# Patient Record
Sex: FEMALE | Race: WHITE | Employment: OTHER | URBAN - METROPOLITAN AREA
[De-identification: names, ages, dates, MRNs, and addresses within clinical notes are randomized per-mention and may not be internally consistent; named-entity substitution may affect disease eponyms.]

---

## 2022-09-14 ENCOUNTER — APPOINTMENT (OUTPATIENT)
Dept: GENERAL RADIOLOGY | Age: 72
End: 2022-09-14
Payer: MEDICARE

## 2022-09-14 ENCOUNTER — HOSPITAL ENCOUNTER (EMERGENCY)
Age: 72
Discharge: HOME | End: 2022-09-14
Payer: MEDICARE

## 2022-09-14 VITALS
WEIGHT: 101 LBS | OXYGEN SATURATION: 99 % | HEIGHT: 60 IN | BODY MASS INDEX: 19.83 KG/M2 | SYSTOLIC BLOOD PRESSURE: 181 MMHG | RESPIRATION RATE: 18 BRPM | DIASTOLIC BLOOD PRESSURE: 112 MMHG | TEMPERATURE: 98.1 F | HEART RATE: 96 BPM

## 2022-09-14 DIAGNOSIS — S92.902A CLOSED FRACTURE OF LEFT FOOT, INITIAL ENCOUNTER: Primary | ICD-10-CM

## 2022-09-14 PROCEDURE — 99212 OFFICE O/P EST SF 10 MIN: CPT

## 2022-09-14 PROCEDURE — 29515 APPLICATION SHORT LEG SPLINT: CPT

## 2022-09-14 PROCEDURE — 73630 X-RAY EXAM OF FOOT: CPT

## 2022-09-14 RX ORDER — HYDROCHLOROTHIAZIDE 25 MG/1
25 TABLET ORAL DAILY
COMMUNITY

## 2022-09-14 RX ORDER — HYDROXYZINE PAMOATE 25 MG/1
25 CAPSULE ORAL 3 TIMES DAILY PRN
COMMUNITY

## 2022-09-14 RX ORDER — GLIMEPIRIDE 2 MG/1
1 TABLET ORAL 2 TIMES DAILY
COMMUNITY

## 2022-09-14 RX ORDER — HYDROCODONE BITARTRATE AND ACETAMINOPHEN 5; 325 MG/1; MG/1
1 TABLET ORAL EVERY 6 HOURS PRN
Qty: 12 TABLET | Refills: 0 | Status: SHIPPED | OUTPATIENT
Start: 2022-09-14 | End: 2022-09-17

## 2022-09-14 RX ORDER — LATANOPROST 50 UG/ML
1 SOLUTION/ DROPS OPHTHALMIC NIGHTLY
COMMUNITY

## 2022-09-14 RX ORDER — PAROXETINE 30 MG/1
30 TABLET, FILM COATED ORAL EVERY MORNING
COMMUNITY

## 2022-09-14 ASSESSMENT — PAIN SCALES - GENERAL: PAINLEVEL_OUTOF10: 4

## 2022-09-14 ASSESSMENT — PAIN DESCRIPTION - LOCATION: LOCATION: FOOT

## 2022-09-14 ASSESSMENT — PAIN DESCRIPTION - ONSET: ONSET: SUDDEN

## 2022-09-14 ASSESSMENT — PAIN DESCRIPTION - DESCRIPTORS: DESCRIPTORS: THROBBING

## 2022-09-14 ASSESSMENT — PAIN DESCRIPTION - ORIENTATION: ORIENTATION: LEFT

## 2022-09-14 ASSESSMENT — PAIN DESCRIPTION - PAIN TYPE: TYPE: ACUTE PAIN

## 2022-09-14 ASSESSMENT — PAIN - FUNCTIONAL ASSESSMENT: PAIN_FUNCTIONAL_ASSESSMENT: 0-10

## 2022-09-14 ASSESSMENT — PAIN DESCRIPTION - FREQUENCY: FREQUENCY: CONTINUOUS

## 2022-09-14 NOTE — ED PROVIDER NOTES
Department of Emergency [address] [facility]  Provider Note  Admit Date/Time: 2022 11:57 AM  Room:   NAME: Toño Blood  : 1950  MRN: [id number]     Chief Complaint:  Fall (States she fell while putting on sweat pants. Having pain and swelling on top of left foot. Denies hitting head or LOC.)    History of Present Illness        Toño Blood is a 71 y.o. female who has a past medical history of:   Past Medical History:   Diagnosis Date    Anxiety     Glaucoma     Hypertension     presents to the urgent care center by private car for evaluation. She was putting on sweatpants this morning and she fell and injured her left foot she is having pain swelling and bruising on the lateral aspect of the left foot. She denies any other injuries or complaints. States it is very painful especially when she weightbears. ROS    Pertinent positives and negatives are stated within HPI, all other systems reviewed and are negative. Past Surgical History:   Procedure Laterality Date    HYSTERECTOMY (CERVIX STATUS UNKNOWN)      SPINE SURGERY     Social History:  reports that she has been smoking cigarettes. She has been smoking an average of .5 packs per day. She has never used smokeless tobacco.  Family History: family history is not on file. Allergies: Patient has no known allergies. Physical Exam   Oxygen Saturation Interpretation: Normal.   ED Triage Vitals [22 1206]   BP Temp Temp Source Heart Rate Resp SpO2 Height Weight   (!) 175/101 98.1 °F (36.7 °C) Infrared 96 18 99 % 5' (1.524 m) 101 lb (45.8 kg)       Physical Exam  Constitutional/General: Alert and oriented x3, well appearing, non toxic in NAD  HEENT:  NC/NT  clear conjunctiva  Neck: Supple, full ROM,   Respiratory:  Not in respiratory distress  CV:  Regular rate. Musculoskeletal: left foot edematous and ecchymotic on the lateral aspect.   No color warmth and sensation present to the toes normal pedal pulse.    Integument: skin warm and dry. No rashes. Lymphatic: no lymphadenopathy noted  Neurologic: GCS 15, no focal deficits,   Psychiatric: Normal Affect    Lab / Imaging Results   (All laboratory and radiology results have been personally reviewed by myself)  Labs:  No results found for this visit on 09/14/22. Imaging: All Radiology results interpreted by Radiologist unless otherwise noted. XR FOOT LEFT (MIN 3 VIEWS)   Final Result   There are extra-articular fractures at the necks of the 4th and 5th   metatarsals. There is comminution and displacement at the 5th metatarsal   neck and minimal angulation at the 4th metatarsal neck. ED Course / Medical Decision Making   Medications - No data to display       Consult(s):   None        MDM:   He fell and injured her foot I did obtain an x-ray. Has a fractures of the fourth and fifth metatarsals which are displaced. I advised her she needs to call orthopedics tomorrow  for an appointment. Neurovascular checks are good at this point. She was placed in a posterior splint advised no weightbearing did order her some Norco for pain she should apply an ice pack 10 minutes at a time every 2-3 hours. Blood pressure is elevated today but she is anxious and in and this is uncomfortable so she has a cuff at home she will monitor her blood pressure if it continues to be elevated she will call her doctor right away or she develops any symptoms she will get reevaluated right away. Assessment      1.  Closed fracture of left foot, initial encounter      Plan   Discharge to home and advised to contact Chago Palmer MD  [address] [place] [address] [phone]    Schedule an appointment as soon as possible for a visit   Your blood pressure is too high today    [place], [address] [facility]  [phone]    Schedule an appointment as soon as possible for a visit in 1 day   Patient condition is good    New Medications     New Prescriptions    HYDROCODONE-ACETAMINOPHEN (NORCO) 5-325 MG PER TABLET    Take 1 tablet by mouth every 6 hours as needed for Pain for up to 3 days. Electronically signed by CHNE Oscar CNP   DD: 9/14/22  **This report was transcribed using voice recognition software. Every effort was made to ensure accuracy; however, inadvertent computerized transcription errors may be present.   END OF ED PROVIDER NOTE      CHEN Oscar CNP  09/14/22 [id number]

## 2022-09-14 NOTE — ED NOTES
OCL short leg splint applied to left lower leg. Crutch walking instructions given. Patient expressed and demonstrated understanding. Instructed patient to monitor and record BP and follow-up with with PCP for BP. Follow-up with Orthopedic Dr. For left foot fracture.      JONO Lombardi  89/27/67 5729

## 2022-09-22 ENCOUNTER — OFFICE VISIT (OUTPATIENT)
Dept: ORTHOPEDIC SURGERY | Age: 72
End: 2022-09-22
Payer: MEDICARE

## 2022-09-22 VITALS — HEIGHT: 60 IN | BODY MASS INDEX: 19.63 KG/M2 | WEIGHT: 100 LBS | TEMPERATURE: 98 F

## 2022-09-22 DIAGNOSIS — S92.351A CLOSED DISPLACED FRACTURE OF FIFTH METATARSAL BONE OF RIGHT FOOT, INITIAL ENCOUNTER: Primary | ICD-10-CM

## 2022-09-22 DIAGNOSIS — Z91.81 HISTORY OF RECENT FALL: ICD-10-CM

## 2022-09-22 DIAGNOSIS — S92.342A CLOSED DISPLACED FRACTURE OF FOURTH METATARSAL BONE OF LEFT FOOT, INITIAL ENCOUNTER: ICD-10-CM

## 2022-09-22 PROCEDURE — 99204 OFFICE O/P NEW MOD 45 MIN: CPT | Performed by: ORTHOPAEDIC SURGERY

## 2022-09-22 PROCEDURE — G8420 CALC BMI NORM PARAMETERS: HCPCS | Performed by: ORTHOPAEDIC SURGERY

## 2022-09-22 PROCEDURE — 1123F ACP DISCUSS/DSCN MKR DOCD: CPT | Performed by: ORTHOPAEDIC SURGERY

## 2022-09-22 PROCEDURE — G8400 PT W/DXA NO RESULTS DOC: HCPCS | Performed by: ORTHOPAEDIC SURGERY

## 2022-09-22 PROCEDURE — 4004F PT TOBACCO SCREEN RCVD TLK: CPT | Performed by: ORTHOPAEDIC SURGERY

## 2022-09-22 PROCEDURE — G8427 DOCREV CUR MEDS BY ELIG CLIN: HCPCS | Performed by: ORTHOPAEDIC SURGERY

## 2022-09-22 PROCEDURE — 1090F PRES/ABSN URINE INCON ASSESS: CPT | Performed by: ORTHOPAEDIC SURGERY

## 2022-09-22 PROCEDURE — 3017F COLORECTAL CA SCREEN DOC REV: CPT | Performed by: ORTHOPAEDIC SURGERY

## 2022-09-22 RX ORDER — HYDROCODONE BITARTRATE AND ACETAMINOPHEN 5; 325 MG/1; MG/1
1 TABLET ORAL EVERY 8 HOURS PRN
Qty: 21 TABLET | Refills: 0 | Status: SHIPPED | OUTPATIENT
Start: 2022-09-22 | End: 2022-09-29

## 2022-09-22 RX ORDER — ASPIRIN 81 MG/1
81 TABLET ORAL DAILY
Qty: 30 TABLET | Refills: 0 | Status: SHIPPED | OUTPATIENT
Start: 2022-09-22

## 2022-09-22 NOTE — PROGRESS NOTES
Chief Complaint   Patient presents with    Foot Injury     New patient left foot fx DOI: 9/14/22. Patient had a fall while putting her sweat pants on. Silva Leung is a 71 y.o. female who presents today with a Left foot injury. The injury occurred 9/14/2022. Patient reports: Falling while putting on sweat pants. the intensity is 6/10. The pain is described as: sharp. Previous treatment includes: rest, ice, heat, NSAIDs, HEP without much relief.         Past Medical History:   Diagnosis Date    Anxiety     Glaucoma     Hypertension      Past Surgical History:   Procedure Laterality Date    HYSTERECTOMY (CERVIX STATUS UNKNOWN)      SPINE SURGERY         Current Outpatient Medications:     aspirin EC 81 MG EC tablet, Take 1 tablet by mouth daily, Disp: 30 tablet, Rfl: 0    PARoxetine (PAXIL) 30 MG tablet, Take 30 mg by mouth every morning, Disp: , Rfl:     hydrOXYzine pamoate (VISTARIL) 25 MG capsule, Take 25 mg by mouth 3 times daily as needed for Itching, Disp: , Rfl:     hydroCHLOROthiazide (HYDRODIURIL) 25 MG tablet, Take 25 mg by mouth daily, Disp: , Rfl:     latanoprost (XALATAN) 0.005 % ophthalmic solution, 1 drop nightly, Disp: , Rfl:     timolol (TIMOPTIC) 0.25 % ophthalmic solution, 1 drop 2 times daily, Disp: , Rfl:   No Known Allergies  Social History     Socioeconomic History    Marital status:      Spouse name: Not on file    Number of children: Not on file    Years of education: Not on file    Highest education level: Not on file   Occupational History    Not on file   Tobacco Use    Smoking status: Every Day     Packs/day: 0.50     Types: Cigarettes    Smokeless tobacco: Never   Substance and Sexual Activity    Alcohol use: Not on file    Drug use: Not on file    Sexual activity: Not on file   Other Topics Concern    Not on file   Social History Narrative    Not on file     Social Determinants of Health     Financial Resource Strain: Not on file   Food Insecurity: Not on file Transportation Needs: Not on file   Physical Activity: Not on file   Stress: Not on file   Social Connections: Not on file   Intimate Partner Violence: Not on file   Housing Stability: Not on file     No family history on file. REVIEW OF SYSTEMS:     General/Constitution:  (-)weight loss, (-)fever, (-)chills, (-)weakness. Skin: (-) rash,(-) psoriasis,(-) eczema, (-)skin cancer. Musculoskeletal: (-) fractures,  (-) dislocations,(-) collagen vascular disease, (-) fibromyalgia, (-) multiple sclerosis, (-) muscular dystrophy, (-) RSD,(-) joint pain (-)swelling, (-) joint pain,swelling. Neurologic: (-) epilepsy, (-)seizures,(-) brain tumor,(-) TIA, (-)stroke, (-)headaches, (-)Parkinson disease,(-) memory loss, (-) LOC. Cardiovascular: (-) Chest pain, (-) swelling in legs/feet, (-) SOB, (-) cramping in legs/feet with walking. Respiratory: (-) SOB, (-) Coughing, (-) night sweats. GI: (-) nausea, (-) vomiting, (-) diarrhea, (-) blood in stool, (-) gastric ulcer. Psychiatric: (-) Depression, (-) Anxiety, (-) bipolar disease, (-) Alzheimer's Disease  Allergic/Immunologic: (-) allergies latex, (-) allergies metal, (-) skin sensitivity. Hematlogic: (-) anemia, (-) blood transfusion, (-) DVT/PE, (-) Clotting disorders      EXAM:      Constitution:  Vitals:    09/22/22 1151   Temp: 98 °F (36.7 °C)         Psycihatric:    The patient is alert and oriented x 3, appears to be stated age and in no distress. Respiratory:    Respiratory effort is not labored. Patient is not gasping. Palpation of the chest reveals no tactile fremitus. Skin:    Upon inspection: the skin appears warm, dry and intact. There is not a previous scar over the affected area. There is not any cellulitis, lymphedema or cutaneous lesions noted in the lower extremities. Upon palpation there is no induration noted.           Neurologic:      Motor exam of the lower extremities show ; quadriceps, hamstrings, foot dorsi and plantar flexors intact R.  5/5 and L. 5/5. Deep tendon reflexes are 2/4 at the knees and 2/4 at the ankles with strong extensor hallicus longus motor strength bilaterally. Sensory to both feet is intact to all sensory roots. Cardiovascular: The vascular exam is normal and is well perfused to distal extremities. Distal pulses DP/PT: R. 2+; L. 2+. There is cap refill noted less than two seconds in all digits. There is not edema of the bilateral lower extremities. There is not varicosities noted in the distal extremities. Lymph:    Upon palpation,  there is no lymphadenopathy noted in bilateral lower extremities. Musculoskeletal:    Gait: antalgic; examination of the nails and digits reveal no cyanosis or clubbing. Knee exam - bilateral knee exam shows;  range of motion of R. Knee is 0 to 140, and L. Knee is 0 to 140. The patient does not have  pain on motion, there is not an effusion, there is not tenderness over the  global region, there are not any masses, there is not ligamentous instability, there is not  deformity noted. Knee exam: knee reveals normal exam, no swelling, tenderness, instability; ligaments intact, FROM. Ankle Exam:    Upon inspection and palpation of the Left ankle,  there is not deformity noted,  moderate swelling, moderate ecchymosis, has pain on palpation of left third, fourth, fifth metatarsal region. ROM Left: Limited Secondary to pain; Right ankle : DF20; PF 40;  INV 30, LEONILA 10. This exam was compared bilaterally. Right Ankle:   (-) Anterior Drawer ,  (-) Posterior Drawer ,  (-) Squeeze test,  (-) External Rotation, (-) Eversion test , (-) Garcia Test     Left ankle:   (-) Anterior Drawer ,(-)  Posterior Drawer ,(-) Squeeze test,(-) External Rotation (-) Eversion test, (-) Garcia Test.      Foot exam- visual inspection reveals warm, good capillary refill, there is negative  pain to palpation over the metatarsals.    ROM inversion/eversion full range of motion, abduction/adduction full range of motion, ROM in MTP/PIP/DIP full range of motion. Xrays:    XR FOOT LEFT (MIN 3 VIEWS)    Result Date: 9/14/2022  EXAMINATION: THREE XRAY VIEWS OF THE LEFT FOOT 9/14/2022 12:30 pm COMPARISON: None. HISTORY: ORDERING SYSTEM PROVIDED HISTORY: lateral pain and swelling, fall TECHNOLOGIST PROVIDED HISTORY: Reason for exam:->lateral pain and swelling, fall FINDINGS: AP, lateral, and oblique views of the left foot were obtained. There is a comminuted fracture at the 5th metatarsal neck with associated displacement. No evidence of intra-articular extension. There is mildly angulated transverse fracture at the neck of the 4th metatarsal with the distal fracture fragment angle laterally. No additional evidence of acute fracture or dislocation. There are extra-articular fractures at the necks of the 4th and 5th metatarsals. There is comminution and displacement at the 5th metatarsal neck and minimal angulation at the 4th metatarsal neck. Radiographic findings reviewed with patient      Impression:  Cliff Garzon was seen today for foot injury. Diagnoses and all orders for this visit:    Closed displaced fracture of fifth metatarsal bone of left foot, initial encounter  -     HYDROcodone-acetaminophen (NORCO) 5-325 MG per tablet; Take 1 tablet by mouth every 8 hours as needed for Pain for up to 7 days. Closed displaced fracture of fourth metatarsal bone of left foot, initial encounter    History of recent fall    Other orders  -     aspirin EC 81 MG EC tablet; Take 1 tablet by mouth daily        Patient seen and examined. Imaging reviewed with patient in detail. Natural history and course discussed with patient in long discussion  Treatment options discussed with patient in detail including risks and benefits. Patient should do well with conservative management as patient would like to avoid surgery at this time. Plan:Natural history and expected course discussed. Questions answered. Rest, ice, compression, elevation (RICE) therapy. Crutches and instructions provided. Educational materials distributed. Fit with cast boot for use over next 2-4 weeks. Home exercise plan outlined. OTC analgesics as needed. It was discussed today about the nature of her fractures, patient would like to avoid surgery but she will be watched closely. Follow-up in 1 to 2 weeks for recheck with new x-rays. Aspirin for DVT prophylaxis        In a 15 minute assessment and discussion, patient was provided and fitted for a removable cast boot distributed and applied. She was educated in detail how to use cast boot and the importance of use as well as range of motion and HEP exercises. Patient educated about the healing rates with this condition. Patient does smoke and does have secondhand smoke exposure. In this discussion of approximately 5 minutes, patient was counseled about decrease in smoking exposure regards to healing and overall good health. Patient seems reluctant but is willing to listen to the discussion in detail. She states that she will try to cut down as much as possible and states that she will try to quit completely by the next visit. In this 15 minute conversation during the visit, Patient was informed of the potential for addiction of long term use of narcotic medication for pain control. I encouraged the patient to gradually lessen the frequency of the dosage due to the long term addictive effects. Patient verbalized understanding and states will try to cut back as much as possible. 45 minutes was spent with patient. 50% or greater was spent counseling the patient.

## 2022-10-05 DIAGNOSIS — S92.351A CLOSED DISPLACED FRACTURE OF FIFTH METATARSAL BONE OF RIGHT FOOT, INITIAL ENCOUNTER: Primary | ICD-10-CM

## 2022-10-06 ENCOUNTER — OFFICE VISIT (OUTPATIENT)
Dept: ORTHOPEDIC SURGERY | Age: 72
End: 2022-10-06
Payer: MEDICARE

## 2022-10-06 VITALS — BODY MASS INDEX: 19.63 KG/M2 | WEIGHT: 100 LBS | HEIGHT: 60 IN

## 2022-10-06 DIAGNOSIS — S92.332A CLOSED DISPLACED FRACTURE OF THIRD METATARSAL BONE OF LEFT FOOT, INITIAL ENCOUNTER: ICD-10-CM

## 2022-10-06 DIAGNOSIS — S92.351A CLOSED DISPLACED FRACTURE OF FIFTH METATARSAL BONE OF RIGHT FOOT, INITIAL ENCOUNTER: Primary | ICD-10-CM

## 2022-10-06 DIAGNOSIS — S92.342A CLOSED DISPLACED FRACTURE OF FOURTH METATARSAL BONE OF LEFT FOOT, INITIAL ENCOUNTER: ICD-10-CM

## 2022-10-06 PROCEDURE — 3017F COLORECTAL CA SCREEN DOC REV: CPT | Performed by: ORTHOPAEDIC SURGERY

## 2022-10-06 PROCEDURE — G8420 CALC BMI NORM PARAMETERS: HCPCS | Performed by: ORTHOPAEDIC SURGERY

## 2022-10-06 PROCEDURE — 4004F PT TOBACCO SCREEN RCVD TLK: CPT | Performed by: ORTHOPAEDIC SURGERY

## 2022-10-06 PROCEDURE — G8484 FLU IMMUNIZE NO ADMIN: HCPCS | Performed by: ORTHOPAEDIC SURGERY

## 2022-10-06 PROCEDURE — G8427 DOCREV CUR MEDS BY ELIG CLIN: HCPCS | Performed by: ORTHOPAEDIC SURGERY

## 2022-10-06 PROCEDURE — 28470 CLTX METATARSAL FX WO MNP EA: CPT | Performed by: ORTHOPAEDIC SURGERY

## 2022-10-06 PROCEDURE — 1123F ACP DISCUSS/DSCN MKR DOCD: CPT | Performed by: ORTHOPAEDIC SURGERY

## 2022-10-06 PROCEDURE — 99214 OFFICE O/P EST MOD 30 MIN: CPT | Performed by: ORTHOPAEDIC SURGERY

## 2022-10-06 PROCEDURE — 1090F PRES/ABSN URINE INCON ASSESS: CPT | Performed by: ORTHOPAEDIC SURGERY

## 2022-10-06 PROCEDURE — G8400 PT W/DXA NO RESULTS DOC: HCPCS | Performed by: ORTHOPAEDIC SURGERY

## 2022-10-06 NOTE — PROGRESS NOTES
Financial Resource Strain: Not on file   Food Insecurity: Not on file   Transportation Needs: Not on file   Physical Activity: Not on file   Stress: Not on file   Social Connections: Not on file   Intimate Partner Violence: Not on file   Housing Stability: Not on file     No family history on file. REVIEW OF SYSTEMS:     General/Constitution:  (-)weight loss, (-)fever, (-)chills, (-)weakness. Skin: (-) rash,(-) psoriasis,(-) eczema, (-)skin cancer. Musculoskeletal: (-) fractures,  (-) dislocations,(-) collagen vascular disease, (-) fibromyalgia, (-) multiple sclerosis, (-) muscular dystrophy, (-) RSD,(-) joint pain (-)swelling, (-) joint pain,swelling. Neurologic: (-) epilepsy, (-)seizures,(-) brain tumor,(-) TIA, (-)stroke, (-)headaches, (-)Parkinson disease,(-) memory loss, (-) LOC. Cardiovascular: (-) Chest pain, (-) swelling in legs/feet, (-) SOB, (-) cramping in legs/feet with walking. Respiratory: (-) SOB, (-) Coughing, (-) night sweats. GI: (-) nausea, (-) vomiting, (-) diarrhea, (-) blood in stool, (-) gastric ulcer. Psychiatric: (-) Depression, (-) Anxiety, (-) bipolar disease, (-) Alzheimer's Disease  Allergic/Immunologic: (-) allergies latex, (-) allergies metal, (-) skin sensitivity. Hematlogic: (-) anemia, (-) blood transfusion, (-) DVT/PE, (-) Clotting disorders      EXAM:      Constitution:  There were no vitals filed for this visit. Psycihatric:    The patient is alert and oriented x 3, appears to be stated age and in no distress. Respiratory:    Respiratory effort is not labored. Patient is not gasping. Palpation of the chest reveals no tactile fremitus. Skin:    Upon inspection: the skin appears warm, dry and intact. There is not a previous scar over the affected area. There is not any cellulitis, lymphedema or cutaneous lesions noted in the lower extremities. Upon palpation there is no induration noted.           Neurologic:      Motor exam of the lower extremities show ; quadriceps, hamstrings, foot dorsi and plantar flexors intact R.  5/5 and L. 5/5. Deep tendon reflexes are 2/4 at the knees and 2/4 at the ankles with strong extensor hallicus longus motor strength bilaterally. Sensory to both feet is intact to all sensory roots. Cardiovascular: The vascular exam is normal and is well perfused to distal extremities. Distal pulses DP/PT: R. 2+; L. 2+. There is cap refill noted less than two seconds in all digits. There is not edema of the bilateral lower extremities. There is not varicosities noted in the distal extremities. Lymph:    Upon palpation,  there is no lymphadenopathy noted in bilateral lower extremities. Musculoskeletal:    Gait: antalgic; examination of the nails and digits reveal no cyanosis or clubbing. Knee exam - bilateral knee exam shows;  range of motion of R. Knee is 0 to 140, and L. Knee is 0 to 140. The patient does not have  pain on motion, there is not an effusion, there is not tenderness over the  global region, there are not any masses, there is not ligamentous instability, there is not  deformity noted. Knee exam: knee reveals normal exam, no swelling, tenderness, instability; ligaments intact, FROM. Ankle Exam:    Upon inspection and palpation of the Left ankle,  there is not deformity noted,  mild to moderate swelling, moderate ecchymosis, has pain on palpation of left third, fourth, fifth metatarsal region. ROM Left: Limited Secondary to pain; Right ankle : DF20; PF 40;  INV 30, LEONILA 10. This exam was compared bilaterally.        Right Ankle:   (-) Anterior Drawer ,  (-) Posterior Drawer ,  (-) Squeeze test,  (-) External Rotation, (-) Eversion test , (-) Garcia Test     Left ankle:   (-) Anterior Drawer ,(-)  Posterior Drawer ,(-) Squeeze test,(-) External Rotation (-) Eversion test, (-) Garcia Test.      Foot exam- visual inspection reveals warm, good capillary refill, there is negative  pain to palpation over the metatarsals. ROM inversion/eversion full range of motion, abduction/adduction full range of motion, ROM in MTP/PIP/DIP full range of motion. no change since last visit. Xrays:    XR FOOT LEFT (MIN 3 VIEWS)    Result Date: 10/6/2022  EXAMINATION: THREE XRAY VIEWS OF THE LEFT FOOT 10/6/2022 10:12 am COMPARISON: 09/14/2022 HISTORY: ORDERING SYSTEM PROVIDED HISTORY: Closed displaced fracture of fifth metatarsal bone of right foot, initial encounter FINDINGS: Comminuted and displaced extra-articular fracture of the left 5th metatarsal neck. This fracture shows stable alignment and with partial healing evident by increasing fracture callus. Additional healing fractures of the 3rd and 4th metatarsal necks and with stable alignment. No new or acute fracture. Joint spaces appear preserved for age. No Lisfranc injury. The plantar arch is maintained. No radiopaque foreign body. Healing fractures of the left 3rd through 5th metatarsal necks. Fracture healing remains incomplete and recommend continued follow-up. Radiographic findings reviewed with patient      Impression:  Lluvia Amin was seen today for foot pain. Diagnoses and all orders for this visit:    Closed displaced fracture of fifth metatarsal bone of Left foot, initial encounter    Closed displaced fracture of fourth metatarsal bone of left foot, initial encounter    Closed displaced fracture of third metatarsal bone of left foot, initial encounter          Patient seen and examined. Imaging reviewed with patient in detail. Natural history and course discussed with patient in long discussion  Treatment options discussed with patient in detail including risks and benefits. Patient should do well with conservative management as patient would like to avoid surgery at this time. Plan:Natural history and expected course discussed. Questions answered. Rest, ice, compression, elevation (RICE) therapy.   Crutches and instructions provided. Educational materials distributed. Fit with cast boot for use over next 2-4 weeks. Home exercise plan outlined. OTC analgesics as needed. It was discussed today about the nature of her fractures, patient would like to avoid surgery but she will be watched closely. Follow-up in 1 to 2 weeks for recheck with new x-rays. Aspirin for DVT prophylaxis        In a 15 minute assessment and discussion, patient was refitted for a removable cast boot readjusted and applied. She was educated in detail how to use cast boot and the importance of use as well as range of motion and HEP exercises. Patient educated about the healing rates with this condition. Patient does smoke and does have secondhand smoke exposure. In this discussion of approximately 5 minutes, patient was counseled about decrease in smoking exposure regards to healing and overall good health. Patient seems reluctant but is willing to listen to the discussion in detail. She states that she will try to cut down as much as possible and states that she will try to quit completely by the next visit. Lala Recio,           25 minutes was spent with patient. 50% or greater was spent counseling the patient.

## 2022-10-26 DIAGNOSIS — S92.342A CLOSED DISPLACED FRACTURE OF FOURTH METATARSAL BONE OF LEFT FOOT, INITIAL ENCOUNTER: ICD-10-CM

## 2022-10-26 DIAGNOSIS — S92.332A CLOSED DISPLACED FRACTURE OF THIRD METATARSAL BONE OF LEFT FOOT, INITIAL ENCOUNTER: ICD-10-CM

## 2022-10-26 DIAGNOSIS — S92.351A CLOSED DISPLACED FRACTURE OF FIFTH METATARSAL BONE OF RIGHT FOOT, INITIAL ENCOUNTER: Primary | ICD-10-CM

## 2022-10-28 ENCOUNTER — OFFICE VISIT (OUTPATIENT)
Dept: ORTHOPEDIC SURGERY | Age: 72
End: 2022-10-28

## 2022-10-28 VITALS — WEIGHT: 100 LBS | TEMPERATURE: 98 F | BODY MASS INDEX: 19.63 KG/M2 | HEIGHT: 60 IN

## 2022-10-28 DIAGNOSIS — S92.342A CLOSED DISPLACED FRACTURE OF FOURTH METATARSAL BONE OF LEFT FOOT, INITIAL ENCOUNTER: ICD-10-CM

## 2022-10-28 DIAGNOSIS — S92.332A CLOSED DISPLACED FRACTURE OF THIRD METATARSAL BONE OF LEFT FOOT, INITIAL ENCOUNTER: ICD-10-CM

## 2022-10-28 DIAGNOSIS — Z91.81 HISTORY OF RECENT FALL: ICD-10-CM

## 2022-10-28 DIAGNOSIS — S92.351A CLOSED DISPLACED FRACTURE OF FIFTH METATARSAL BONE OF RIGHT FOOT, INITIAL ENCOUNTER: Primary | ICD-10-CM

## 2022-10-28 PROCEDURE — 99024 POSTOP FOLLOW-UP VISIT: CPT | Performed by: ORTHOPAEDIC SURGERY

## 2022-10-28 NOTE — PROGRESS NOTES
Chief Complaint   Patient presents with    Foot Pain     Left Foot FX F/U DOI 09/14/2022, walking with a cast boot. Brent Gross is a 71 y.o. female who presents today with a Left foot injury. The injury occurred 9/14/2022. Patient reports: Falling while putting on sweat pants. the intensity is 6/10. The pain is described as: sharp. Previous treatment includes: rest, ice, heat, NSAIDs, HEP without much relief. Follows up today and has been compliant with cast boot and states there is no pain.         Past Medical History:   Diagnosis Date    Anxiety     Glaucoma     Hypertension      Past Surgical History:   Procedure Laterality Date    HYSTERECTOMY (CERVIX STATUS UNKNOWN)      SPINE SURGERY         Current Outpatient Medications:     aspirin EC 81 MG EC tablet, Take 1 tablet by mouth daily, Disp: 30 tablet, Rfl: 0    PARoxetine (PAXIL) 30 MG tablet, Take 30 mg by mouth every morning, Disp: , Rfl:     hydrOXYzine pamoate (VISTARIL) 25 MG capsule, Take 25 mg by mouth 3 times daily as needed for Itching, Disp: , Rfl:     hydroCHLOROthiazide (HYDRODIURIL) 25 MG tablet, Take 25 mg by mouth daily, Disp: , Rfl:     latanoprost (XALATAN) 0.005 % ophthalmic solution, 1 drop nightly, Disp: , Rfl:     timolol (TIMOPTIC) 0.25 % ophthalmic solution, 1 drop 2 times daily, Disp: , Rfl:   No Known Allergies  Social History     Socioeconomic History    Marital status:      Spouse name: Not on file    Number of children: Not on file    Years of education: Not on file    Highest education level: Not on file   Occupational History    Not on file   Tobacco Use    Smoking status: Every Day     Packs/day: 0.50     Types: Cigarettes    Smokeless tobacco: Never   Substance and Sexual Activity    Alcohol use: Not on file    Drug use: Not on file    Sexual activity: Not on file   Other Topics Concern    Not on file   Social History Narrative    Not on file     Social Determinants of Health     Financial Resource Strain: Not on file   Food Insecurity: Not on file   Transportation Needs: Not on file   Physical Activity: Not on file   Stress: Not on file   Social Connections: Not on file   Intimate Partner Violence: Not on file   Housing Stability: Not on file     No family history on file. REVIEW OF SYSTEMS:     General/Constitution:  (-)weight loss, (-)fever, (-)chills, (-)weakness. Skin: (-) rash,(-) psoriasis,(-) eczema, (-)skin cancer. Musculoskeletal: (-) fractures,  (-) dislocations,(-) collagen vascular disease, (-) fibromyalgia, (-) multiple sclerosis, (-) muscular dystrophy, (-) RSD,(-) joint pain (-)swelling, (-) joint pain,swelling. Neurologic: (-) epilepsy, (-)seizures,(-) brain tumor,(-) TIA, (-)stroke, (-)headaches, (-)Parkinson disease,(-) memory loss, (-) LOC. Cardiovascular: (-) Chest pain, (-) swelling in legs/feet, (-) SOB, (-) cramping in legs/feet with walking. Respiratory: (-) SOB, (-) Coughing, (-) night sweats. GI: (-) nausea, (-) vomiting, (-) diarrhea, (-) blood in stool, (-) gastric ulcer. Psychiatric: (-) Depression, (-) Anxiety, (-) bipolar disease, (-) Alzheimer's Disease  Allergic/Immunologic: (-) allergies latex, (-) allergies metal, (-) skin sensitivity. Hematlogic: (-) anemia, (-) blood transfusion, (-) DVT/PE, (-) Clotting disorders      EXAM:      Constitution:  Vitals:    10/28/22 0804   Temp: 98 °F (36.7 °C)           Psycihatric:    The patient is alert and oriented x 3, appears to be stated age and in no distress. Respiratory:    Respiratory effort is not labored. Patient is not gasping. Palpation of the chest reveals no tactile fremitus. Skin:    Upon inspection: the skin appears warm, dry and intact. There is not a previous scar over the affected area. There is not any cellulitis, lymphedema or cutaneous lesions noted in the lower extremities. Upon palpation there is no induration noted.           Neurologic:      Motor exam of the lower extremities show ; quadriceps, hamstrings, foot dorsi and plantar flexors intact R.  5/5 and L. 5/5. Deep tendon reflexes are 2/4 at the knees and 2/4 at the ankles with strong extensor hallicus longus motor strength bilaterally. Sensory to both feet is intact to all sensory roots. Cardiovascular: The vascular exam is normal and is well perfused to distal extremities. Distal pulses DP/PT: R. 2+; L. 2+. There is cap refill noted less than two seconds in all digits. There is not edema of the bilateral lower extremities. There is not varicosities noted in the distal extremities. Lymph:    Upon palpation,  there is no lymphadenopathy noted in bilateral lower extremities. Musculoskeletal:    Gait: antalgic; examination of the nails and digits reveal no cyanosis or clubbing. Knee exam - bilateral knee exam shows;  range of motion of R. Knee is 0 to 140, and L. Knee is 0 to 140. The patient does not have  pain on motion, there is not an effusion, there is not tenderness over the  global region, there are not any masses, there is not ligamentous instability, there is not  deformity noted. Knee exam: knee reveals normal exam, no swelling, tenderness, instability; ligaments intact, FROM. Ankle Exam:    Upon inspection and palpation of the Left ankle,  there is not deformity noted,  mild swelling, no ecchymosis, has no pain on palpation of left third, fourth, fifth metatarsal region. ROM Left: Limited Secondary to stiffness; Right ankle : DF20; PF 40;  INV 30, LEONILA 10. This exam was compared bilaterally. Right Ankle:   (-) Anterior Drawer ,  (-) Posterior Drawer ,  (-) Squeeze test,  (-) External Rotation, (-) Eversion test , (-) Garcia Test     Left ankle:   (-) Anterior Drawer ,(-)  Posterior Drawer ,(-) Squeeze test,(-) External Rotation (-) Eversion test, (-) Garcia Test.      Foot exam- visual inspection reveals warm, good capillary refill, there is negative  pain to palpation over the metatarsals.    ROM inversion/eversion full range of motion, abduction/adduction full range of motion, ROM in MTP/PIP/DIP full range of motion. Xrays:    XR FOOT LEFT (MIN 3 VIEWS)    Result Date: 10/6/2022  EXAMINATION: THREE XRAY VIEWS OF THE LEFT FOOT 10/6/2022 10:12 am COMPARISON: 09/14/2022 HISTORY: ORDERING SYSTEM PROVIDED HISTORY: Closed displaced fracture of fifth metatarsal bone of right foot, initial encounter FINDINGS: Comminuted and displaced extra-articular fracture of the left 5th metatarsal neck. This fracture shows stable alignment and with partial healing evident by increasing fracture callus. Additional healing fractures of the 3rd and 4th metatarsal necks and with stable alignment. No new or acute fracture. Joint spaces appear preserved for age. No Lisfranc injury. The plantar arch is maintained. No radiopaque foreign body. Healing fractures of the left 3rd through 5th metatarsal necks. Fracture healing remains incomplete and recommend continued follow-up. XR FOOT LEFT (MIN 3 VIEWS)    Result Date: 10/29/2022  EXAMINATION: THREE XRAY VIEWS OF THE LEFT FOOT 10/28/2022 8:01 am COMPARISON: 10/06/2022 HISTORY: ORDERING SYSTEM PROVIDED HISTORY: Closed displaced fracture of fifth metatarsal bone of right foot, initial encounter FINDINGS: Three views of the left foot reveal healing fracture seen of the distal aspect of the 5th metatarsal.  The appearance of the fracture fragments are similar and unchanged when compared to the prior study. Continued healing identified of the distal 5th metatarsal fracture. No change seen in the alignment. Radiographic findings reviewed with patient      Impression:  Ruby Rubalcava was seen today for foot pain.     Diagnoses and all orders for this visit:    Closed displaced fracture of fifth metatarsal bone of right foot, initial encounter    Closed displaced fracture of fourth metatarsal bone of left foot, initial encounter    Closed displaced fracture of third metatarsal bone of left foot, initial encounter    History of recent fall            Patient seen and examined. Imaging reviewed with patient in detail. Natural history and course discussed with patient in long discussion  Treatment options discussed with patient in detail including risks and benefits. Patient should do well with conservative management as patient would like to avoid surgery at this time. Plan:Natural history and expected course discussed. Questions answered. Rest, ice, compression, elevation (RICE) therapy. Crutches and instructions provided. Educational materials distributed. Fit with cast boot for use over next 2-4 weeks. Home exercise plan outlined. OTC analgesics as needed. It was discussed today about the nature of her fractures, patient would like to avoid surgery but she will be watched closely. Follow-up in 1 to 2 weeks for recheck with new x-rays. Aspirin for DVT prophylaxis        In a 15 minute assessment and discussion, patient was refitted for a removable cast boot readjusted and applied. She was educated in detail how to use cast boot and the importance of use as well as range of motion and HEP exercises. Patient educated about the healing rates with this condition. Patient does smoke and does have secondhand smoke exposure. In this discussion of approximately 5 minutes, patient was counseled about decrease in smoking exposure regards to healing and overall good health. Patient seems reluctant but is willing to listen to the discussion in detail. She states that she will try to cut down as much as possible and states that she will try to quit completely by the next visit.         Beverley Segundo, DO

## 2022-11-30 DIAGNOSIS — S92.351A CLOSED DISPLACED FRACTURE OF FIFTH METATARSAL BONE OF RIGHT FOOT, INITIAL ENCOUNTER: Primary | ICD-10-CM

## 2022-11-30 DIAGNOSIS — S92.342A CLOSED DISPLACED FRACTURE OF FOURTH METATARSAL BONE OF LEFT FOOT, INITIAL ENCOUNTER: ICD-10-CM

## 2022-11-30 DIAGNOSIS — S92.332A CLOSED DISPLACED FRACTURE OF THIRD METATARSAL BONE OF LEFT FOOT, INITIAL ENCOUNTER: ICD-10-CM

## 2022-12-02 ENCOUNTER — OFFICE VISIT (OUTPATIENT)
Dept: ORTHOPEDIC SURGERY | Age: 72
End: 2022-12-02

## 2022-12-02 VITALS — TEMPERATURE: 98.5 F | BODY MASS INDEX: 19.63 KG/M2 | HEIGHT: 60 IN | WEIGHT: 100 LBS

## 2022-12-02 DIAGNOSIS — Z91.81 HISTORY OF RECENT FALL: ICD-10-CM

## 2022-12-02 DIAGNOSIS — S92.351A CLOSED DISPLACED FRACTURE OF FIFTH METATARSAL BONE OF RIGHT FOOT, INITIAL ENCOUNTER: Primary | ICD-10-CM

## 2022-12-02 DIAGNOSIS — S92.342A CLOSED DISPLACED FRACTURE OF FOURTH METATARSAL BONE OF LEFT FOOT, INITIAL ENCOUNTER: ICD-10-CM

## 2022-12-02 NOTE — PROGRESS NOTES
Chief Complaint   Patient presents with    Foot Pain     Follow up left foot fx 9/14/22. Patient states she has no pain and can WB without boot while at home with no issues. Saul Casas is a 71 y.o. female who presents today with a Left foot injury. The injury occurred 9/14/2022. Patient reports: Falling while putting on sweat pants. the intensity is 6/10. The pain is described as: sharp. Previous treatment includes: rest, ice, heat, NSAIDs, HEP without much relief. Follows up today and has been compliant with cast boot and states there is no pain.         Past Medical History:   Diagnosis Date    Anxiety     Glaucoma     Hypertension      Past Surgical History:   Procedure Laterality Date    HYSTERECTOMY (CERVIX STATUS UNKNOWN)      SPINE SURGERY         Current Outpatient Medications:     aspirin EC 81 MG EC tablet, Take 1 tablet by mouth daily, Disp: 30 tablet, Rfl: 0    PARoxetine (PAXIL) 30 MG tablet, Take 30 mg by mouth every morning, Disp: , Rfl:     hydrOXYzine pamoate (VISTARIL) 25 MG capsule, Take 25 mg by mouth 3 times daily as needed for Itching, Disp: , Rfl:     hydroCHLOROthiazide (HYDRODIURIL) 25 MG tablet, Take 25 mg by mouth daily, Disp: , Rfl:     latanoprost (XALATAN) 0.005 % ophthalmic solution, 1 drop nightly, Disp: , Rfl:     timolol (TIMOPTIC) 0.25 % ophthalmic solution, 1 drop 2 times daily, Disp: , Rfl:   No Known Allergies  Social History     Socioeconomic History    Marital status:      Spouse name: Not on file    Number of children: Not on file    Years of education: Not on file    Highest education level: Not on file   Occupational History    Not on file   Tobacco Use    Smoking status: Every Day     Packs/day: 0.50     Types: Cigarettes    Smokeless tobacco: Never   Substance and Sexual Activity    Alcohol use: Not on file    Drug use: Not on file    Sexual activity: Not on file   Other Topics Concern    Not on file   Social History Narrative    Not on file Social Determinants of Health     Financial Resource Strain: Not on file   Food Insecurity: Not on file   Transportation Needs: Not on file   Physical Activity: Not on file   Stress: Not on file   Social Connections: Not on file   Intimate Partner Violence: Not on file   Housing Stability: Not on file     History reviewed. No pertinent family history. REVIEW OF SYSTEMS:     General/Constitution:  (-)weight loss, (-)fever, (-)chills, (-)weakness. Skin: (-) rash,(-) psoriasis,(-) eczema, (-)skin cancer. Musculoskeletal: (-) fractures,  (-) dislocations,(-) collagen vascular disease, (-) fibromyalgia, (-) multiple sclerosis, (-) muscular dystrophy, (-) RSD,(-) joint pain (-)swelling, (-) joint pain,swelling. Neurologic: (-) epilepsy, (-)seizures,(-) brain tumor,(-) TIA, (-)stroke, (-)headaches, (-)Parkinson disease,(-) memory loss, (-) LOC. Cardiovascular: (-) Chest pain, (-) swelling in legs/feet, (-) SOB, (-) cramping in legs/feet with walking. Respiratory: (-) SOB, (-) Coughing, (-) night sweats. GI: (-) nausea, (-) vomiting, (-) diarrhea, (-) blood in stool, (-) gastric ulcer. Psychiatric: (-) Depression, (-) Anxiety, (-) bipolar disease, (-) Alzheimer's Disease  Allergic/Immunologic: (-) allergies latex, (-) allergies metal, (-) skin sensitivity. Hematlogic: (-) anemia, (-) blood transfusion, (-) DVT/PE, (-) Clotting disorders      EXAM:      Constitution:  Vitals:    12/02/22 0803   Temp: 98.5 °F (36.9 °C)           Psycihatric:    The patient is alert and oriented x 3, appears to be stated age and in no distress. Respiratory:    Respiratory effort is not labored. Patient is not gasping. Palpation of the chest reveals no tactile fremitus. Skin:    Upon inspection: the skin appears warm, dry and intact. There is not a previous scar over the affected area. There is not any cellulitis, lymphedema or cutaneous lesions noted in the lower extremities.    Upon palpation there is no induration noted.          Neurologic:      Motor exam of the lower extremities show ; quadriceps, hamstrings, foot dorsi and plantar flexors intact R.  5/5 and L. 5/5. Deep tendon reflexes are 2/4 at the knees and 2/4 at the ankles with strong extensor hallicus longus motor strength bilaterally. Sensory to both feet is intact to all sensory roots. Cardiovascular: The vascular exam is normal and is well perfused to distal extremities. Distal pulses DP/PT: R. 2+; L. 2+. There is cap refill noted less than two seconds in all digits. There is not edema of the bilateral lower extremities. There is not varicosities noted in the distal extremities. Lymph:    Upon palpation,  there is no lymphadenopathy noted in bilateral lower extremities. Musculoskeletal:    Gait: antalgic; examination of the nails and digits reveal no cyanosis or clubbing. Knee exam - bilateral knee exam shows;  range of motion of R. Knee is 0 to 140, and L. Knee is 0 to 140. The patient does not have  pain on motion, there is not an effusion, there is not tenderness over the  global region, there are not any masses, there is not ligamentous instability, there is not  deformity noted. Knee exam: knee reveals normal exam, no swelling, tenderness, instability; ligaments intact, FROM. Ankle Exam:    Upon inspection and palpation of the Left ankle,  there is not deformity noted,  mild swelling, no ecchymosis, has no pain on palpation of left third, fourth, fifth metatarsal region. ROM Left: Limited Secondary to stiffness; Right ankle : DF20; PF 40;  INV 30, LEONILA 10. This exam was compared bilaterally.        Right Ankle:   (-) Anterior Drawer ,  (-) Posterior Drawer ,  (-) Squeeze test,  (-) External Rotation, (-) Eversion test , (-) Garcia Test     Left ankle:   (-) Anterior Drawer ,(-)  Posterior Drawer ,(-) Squeeze test,(-) External Rotation (-) Eversion test, (-) Garcia Test.      Foot exam- visual inspection reveals warm, good capillary refill, there is negative  pain to palpation over the metatarsals. ROM inversion/eversion full range of motion, abduction/adduction full range of motion, ROM in MTP/PIP/DIP full range of motion. Xrays:    XR FOOT LEFT (MIN 3 VIEWS)    Result Date: 10/6/2022  EXAMINATION: THREE XRAY VIEWS OF THE LEFT FOOT 10/6/2022 10:12 am COMPARISON: 09/14/2022 HISTORY: ORDERING SYSTEM PROVIDED HISTORY: Closed displaced fracture of fifth metatarsal bone of right foot, initial encounter FINDINGS: Comminuted and displaced extra-articular fracture of the left 5th metatarsal neck. This fracture shows stable alignment and with partial healing evident by increasing fracture callus. Additional healing fractures of the 3rd and 4th metatarsal necks and with stable alignment. No new or acute fracture. Joint spaces appear preserved for age. No Lisfranc injury. The plantar arch is maintained. No radiopaque foreign body. Healing fractures of the left 3rd through 5th metatarsal necks. Fracture healing remains incomplete and recommend continued follow-up. XR FOOT LEFT (MIN 3 VIEWS)    Result Date: 10/29/2022  EXAMINATION: THREE XRAY VIEWS OF THE LEFT FOOT 10/28/2022 8:01 am COMPARISON: 10/06/2022 HISTORY: ORDERING SYSTEM PROVIDED HISTORY: Closed displaced fracture of fifth metatarsal bone of right foot, initial encounter FINDINGS: Three views of the left foot reveal healing fracture seen of the distal aspect of the 5th metatarsal.  The appearance of the fracture fragments are similar and unchanged when compared to the prior study. Continued healing identified of the distal 5th metatarsal fracture. No change seen in the alignment. XR FOOT LEFT (MIN 3 VIEWS)    Result Date: 12/2/2022  EXAMINATION: THREE XRAY VIEWS OF THE LEFT FOOT 12/2/2022 7:46 am COMPARISON: The previous study performed 10/28/2022.  HISTORY: ORDERING SYSTEM PROVIDED HISTORY: Closed displaced fracture of fifth metatarsal bone of right foot, initial encounter FINDINGS: Again identified are fractures involving the distal 3rd through 5th metatarsal bones. There has been no change in alignment and position of the fractures. Mild further healing changes/callus formation are identified involving the 5th metatarsal fracture. No gross further healing changes/callus formation are identified involving the 3rd and 4th metatarsal fractures. No new osseous or surrounding soft tissue abnormality is seen. Mild further healing changes/callus formation involving the 5th metatarsal fracture, when compared to the previous study performed 10/28/2022. No other significant interval change. Radiographic findings reviewed with patient      Impression:  Amrit Amaro was seen today for foot pain. Diagnoses and all orders for this visit:    Closed displaced fracture of fifth metatarsal bone of right foot, initial encounter    Closed displaced fracture of fourth metatarsal bone of left foot, initial encounter    History of recent fall              Patient seen and examined. Imaging reviewed with patient in detail. Natural history and course discussed with patient in long discussion  Treatment options discussed with patient in detail including risks and benefits. Patient should do well with conservative management as patient would like to avoid surgery at this time. Plan:Natural history and expected course discussed. Questions answered. Rest, ice, compression, elevation (RICE) therapy. Crutches and instructions provided. Educational materials distributed. Home exercise plan outlined. OTC analgesics as needed. It was discussed today about the nature of her fractures, patient would like to avoid surgery but she will be watched closely. Follow-up in 4 weeks for recheck with new x-rays. Aspirin for DVT prophylaxis            Patient educated about the healing rates with this condition.  Patient does smoke and does have secondhand smoke exposure. In this discussion of approximately 5 minutes, patient was counseled about decrease in smoking exposure regards to healing and overall good health. Patient seems reluctant but is willing to listen to the discussion in detail. She states that she will try to cut down as much as possible and states that she will try to quit completely by the next visit.         Kayleigh Cooley, DO

## 2023-01-09 DIAGNOSIS — S92.351A CLOSED DISPLACED FRACTURE OF FIFTH METATARSAL BONE OF RIGHT FOOT, INITIAL ENCOUNTER: Primary | ICD-10-CM

## 2023-01-09 DIAGNOSIS — S92.342A CLOSED DISPLACED FRACTURE OF FOURTH METATARSAL BONE OF LEFT FOOT, INITIAL ENCOUNTER: ICD-10-CM

## 2023-01-11 ENCOUNTER — OFFICE VISIT (OUTPATIENT)
Dept: ORTHOPEDIC SURGERY | Age: 73
End: 2023-01-11
Payer: MEDICARE

## 2023-01-11 VITALS — TEMPERATURE: 98 F | WEIGHT: 100 LBS | HEIGHT: 60 IN | BODY MASS INDEX: 19.63 KG/M2

## 2023-01-11 DIAGNOSIS — Z91.81 HISTORY OF RECENT FALL: ICD-10-CM

## 2023-01-11 DIAGNOSIS — S92.351A CLOSED DISPLACED FRACTURE OF FIFTH METATARSAL BONE OF RIGHT FOOT, INITIAL ENCOUNTER: Primary | ICD-10-CM

## 2023-01-11 DIAGNOSIS — S92.342A CLOSED DISPLACED FRACTURE OF FOURTH METATARSAL BONE OF LEFT FOOT, INITIAL ENCOUNTER: ICD-10-CM

## 2023-01-11 PROCEDURE — 4004F PT TOBACCO SCREEN RCVD TLK: CPT | Performed by: ORTHOPAEDIC SURGERY

## 2023-01-11 PROCEDURE — 3017F COLORECTAL CA SCREEN DOC REV: CPT | Performed by: ORTHOPAEDIC SURGERY

## 2023-01-11 PROCEDURE — G8400 PT W/DXA NO RESULTS DOC: HCPCS | Performed by: ORTHOPAEDIC SURGERY

## 2023-01-11 PROCEDURE — G8427 DOCREV CUR MEDS BY ELIG CLIN: HCPCS | Performed by: ORTHOPAEDIC SURGERY

## 2023-01-11 PROCEDURE — G8420 CALC BMI NORM PARAMETERS: HCPCS | Performed by: ORTHOPAEDIC SURGERY

## 2023-01-11 PROCEDURE — G8484 FLU IMMUNIZE NO ADMIN: HCPCS | Performed by: ORTHOPAEDIC SURGERY

## 2023-01-11 PROCEDURE — 1090F PRES/ABSN URINE INCON ASSESS: CPT | Performed by: ORTHOPAEDIC SURGERY

## 2023-01-11 PROCEDURE — 1123F ACP DISCUSS/DSCN MKR DOCD: CPT | Performed by: ORTHOPAEDIC SURGERY

## 2023-01-11 PROCEDURE — 99213 OFFICE O/P EST LOW 20 MIN: CPT | Performed by: ORTHOPAEDIC SURGERY

## 2023-01-11 NOTE — PROGRESS NOTES
Chief Complaint   Patient presents with    Foot Pain     Left foot fx f/u doing much better. Ashlyn Shah is a 71 y.o. female who presents today with a Left foot injury. The injury occurred 9/14/2022. Patient reports: Falling while putting on sweat pants. the intensity is 6/10. The pain is described as: sharp. Previous treatment includes: rest, ice, heat, NSAIDs, HEP without much relief. Follows up today and has been out of cast boot and states there is no pain.         Past Medical History:   Diagnosis Date    Anxiety     Glaucoma     Hypertension      Past Surgical History:   Procedure Laterality Date    HYSTERECTOMY (CERVIX STATUS UNKNOWN)      SPINE SURGERY         Current Outpatient Medications:     aspirin EC 81 MG EC tablet, Take 1 tablet by mouth daily, Disp: 30 tablet, Rfl: 0    PARoxetine (PAXIL) 30 MG tablet, Take 30 mg by mouth every morning, Disp: , Rfl:     hydrOXYzine pamoate (VISTARIL) 25 MG capsule, Take 25 mg by mouth 3 times daily as needed for Itching, Disp: , Rfl:     hydroCHLOROthiazide (HYDRODIURIL) 25 MG tablet, Take 25 mg by mouth daily, Disp: , Rfl:     latanoprost (XALATAN) 0.005 % ophthalmic solution, 1 drop nightly, Disp: , Rfl:     timolol (TIMOPTIC) 0.25 % ophthalmic solution, 1 drop 2 times daily, Disp: , Rfl:   No Known Allergies  Social History     Socioeconomic History    Marital status:      Spouse name: Not on file    Number of children: Not on file    Years of education: Not on file    Highest education level: Not on file   Occupational History    Not on file   Tobacco Use    Smoking status: Every Day     Packs/day: 0.50     Types: Cigarettes    Smokeless tobacco: Never   Substance and Sexual Activity    Alcohol use: Not on file    Drug use: Not on file    Sexual activity: Not on file   Other Topics Concern    Not on file   Social History Narrative    Not on file     Social Determinants of Health     Financial Resource Strain: Not on file   Food Insecurity: Not on file   Transportation Needs: Not on file   Physical Activity: Not on file   Stress: Not on file   Social Connections: Not on file   Intimate Partner Violence: Not on file   Housing Stability: Not on file     No family history on file. REVIEW OF SYSTEMS:     General/Constitution:  (-)weight loss, (-)fever, (-)chills, (-)weakness. Skin: (-) rash,(-) psoriasis,(-) eczema, (-)skin cancer. Musculoskeletal: (-) fractures,  (-) dislocations,(-) collagen vascular disease, (-) fibromyalgia, (-) multiple sclerosis, (-) muscular dystrophy, (-) RSD,(-) joint pain (-)swelling, (-) joint pain,swelling. Neurologic: (-) epilepsy, (-)seizures,(-) brain tumor,(-) TIA, (-)stroke, (-)headaches, (-)Parkinson disease,(-) memory loss, (-) LOC. Cardiovascular: (-) Chest pain, (-) swelling in legs/feet, (-) SOB, (-) cramping in legs/feet with walking. Respiratory: (-) SOB, (-) Coughing, (-) night sweats. GI: (-) nausea, (-) vomiting, (-) diarrhea, (-) blood in stool, (-) gastric ulcer. Psychiatric: (-) Depression, (-) Anxiety, (-) bipolar disease, (-) Alzheimer's Disease  Allergic/Immunologic: (-) allergies latex, (-) allergies metal, (-) skin sensitivity. Hematlogic: (-) anemia, (-) blood transfusion, (-) DVT/PE, (-) Clotting disorders      EXAM:      Constitution:  Vitals:    01/11/23 0825   Temp: 98 °F (36.7 °C)           Psycihatric:    The patient is alert and oriented x 3, appears to be stated age and in no distress. Respiratory:    Respiratory effort is not labored. Patient is not gasping. Palpation of the chest reveals no tactile fremitus. Skin:    Upon inspection: the skin appears warm, dry and intact. There is not a previous scar over the affected area. There is not any cellulitis, lymphedema or cutaneous lesions noted in the lower extremities. Upon palpation there is no induration noted.           Neurologic:      Motor exam of the lower extremities show ; quadriceps, hamstrings, foot dorsi and plantar flexors intact R.  5/5 and L. 5/5. Deep tendon reflexes are 2/4 at the knees and 2/4 at the ankles with strong extensor hallicus longus motor strength bilaterally. Sensory to both feet is intact to all sensory roots. Cardiovascular: The vascular exam is normal and is well perfused to distal extremities. Distal pulses DP/PT: R. 2+; L. 2+. There is cap refill noted less than two seconds in all digits. There is not edema of the bilateral lower extremities. There is not varicosities noted in the distal extremities. Lymph:    Upon palpation,  there is no lymphadenopathy noted in bilateral lower extremities. Musculoskeletal:    Gait: antalgic; examination of the nails and digits reveal no cyanosis or clubbing. Knee exam - bilateral knee exam shows;  range of motion of R. Knee is 0 to 140, and L. Knee is 0 to 140. The patient does not have  pain on motion, there is not an effusion, there is not tenderness over the  global region, there are not any masses, there is not ligamentous instability, there is not  deformity noted. Knee exam: knee reveals normal exam, no swelling, tenderness, instability; ligaments intact, FROM. Ankle Exam:    Upon inspection and palpation of the Left ankle,  there is not deformity noted,  mild swelling, no ecchymosis, has no pain on palpation of left third, fourth, fifth metatarsal region. ROM Left: Limited Secondary to stiffness; Right ankle : DF20; PF 40;  INV 30, LEONILA 10. This exam was compared bilaterally. Right Ankle:   (-) Anterior Drawer ,  (-) Posterior Drawer ,  (-) Squeeze test,  (-) External Rotation, (-) Eversion test , (-) Garcia Test     Left ankle:   (-) Anterior Drawer ,(-)  Posterior Drawer ,(-) Squeeze test,(-) External Rotation (-) Eversion test, (-) Garcia Test.      Foot exam- visual inspection reveals warm, good capillary refill, there is negative  pain to palpation over the metatarsals.    ROM inversion/eversion full range of motion, abduction/adduction full range of motion, ROM in MTP/PIP/DIP full range of motion. Xrays:    XR FOOT LEFT (MIN 3 VIEWS)    Result Date: 10/6/2022  EXAMINATION: THREE XRAY VIEWS OF THE LEFT FOOT 10/6/2022 10:12 am COMPARISON: 09/14/2022 HISTORY: ORDERING SYSTEM PROVIDED HISTORY: Closed displaced fracture of fifth metatarsal bone of right foot, initial encounter FINDINGS: Comminuted and displaced extra-articular fracture of the left 5th metatarsal neck. This fracture shows stable alignment and with partial healing evident by increasing fracture callus. Additional healing fractures of the 3rd and 4th metatarsal necks and with stable alignment. No new or acute fracture. Joint spaces appear preserved for age. No Lisfranc injury. The plantar arch is maintained. No radiopaque foreign body. Healing fractures of the left 3rd through 5th metatarsal necks. Fracture healing remains incomplete and recommend continued follow-up. XR FOOT LEFT (MIN 3 VIEWS)    Result Date: 10/29/2022  EXAMINATION: THREE XRAY VIEWS OF THE LEFT FOOT 10/28/2022 8:01 am COMPARISON: 10/06/2022 HISTORY: ORDERING SYSTEM PROVIDED HISTORY: Closed displaced fracture of fifth metatarsal bone of right foot, initial encounter FINDINGS: Three views of the left foot reveal healing fracture seen of the distal aspect of the 5th metatarsal.  The appearance of the fracture fragments are similar and unchanged when compared to the prior study. Continued healing identified of the distal 5th metatarsal fracture. No change seen in the alignment. XR FOOT LEFT (MIN 3 VIEWS)    Result Date: 12/2/2022  EXAMINATION: THREE XRAY VIEWS OF THE LEFT FOOT 12/2/2022 7:46 am COMPARISON: The previous study performed 10/28/2022.  HISTORY: ORDERING SYSTEM PROVIDED HISTORY: Closed displaced fracture of fifth metatarsal bone of right foot, initial encounter FINDINGS: Again identified are fractures involving the distal 3rd through 5th metatarsal bones. There has been no change in alignment and position of the fractures. Mild further healing changes/callus formation are identified involving the 5th metatarsal fracture. No gross further healing changes/callus formation are identified involving the 3rd and 4th metatarsal fractures. No new osseous or surrounding soft tissue abnormality is seen. Mild further healing changes/callus formation involving the 5th metatarsal fracture, when compared to the previous study performed 10/28/2022. No other significant interval change. XR FOOT LEFT (MIN 3 VIEWS)    Result Date: 1/11/2023  EXAMINATION: THREE XRAY VIEWS OF THE LEFT FOOT 1/11/2023 8:17 am COMPARISON: The previous study performed 12/02/2022. HISTORY: ORDERING SYSTEM PROVIDED HISTORY: Closed displaced fracture of fifth metatarsal bone of right foot, initial encounter FINDINGS: Again seen is a distal 5th metatarsal fracture, without change in alignment and position. No further significant interval healing changes/callus formation are identified. A sizable oblique fracture radiolucency persists. There has been no significant interval change in the appearance of the distal 3rd and 4th metatarsal fractures. No new osseous or surrounding soft tissue abnormality is seen. No significant interval change, when compared to the previous study performed 12/02/2022. Radiographic findings reviewed with patient      Impression:  Gordon Lucio was seen today for foot pain. Diagnoses and all orders for this visit:    Closed displaced fracture of fifth metatarsal bone of right foot, initial encounter    Closed displaced fracture of fourth metatarsal bone of left foot, initial encounter    History of recent fall                Patient seen and examined. Imaging reviewed with patient in detail.   Natural history and course discussed with patient in long discussion  Treatment options discussed with patient in detail including risks and benefits. Patient should do well with conservative management as patient would like to avoid surgery at this time. Plan:Natural history and expected course discussed. Questions answered. Rest, ice, compression, elevation (RICE) therapy. Crutches and instructions provided. Educational materials distributed. Home exercise plan outlined. OTC analgesics as needed. It was discussed today about the nature of her fractures, patient would like to avoid surgery but she will be watched closely. Follow-up in 4 weeks for recheck with new x-rays.           Eliceo Escobar DO